# Patient Record
Sex: FEMALE | Race: WHITE | NOT HISPANIC OR LATINO | URBAN - METROPOLITAN AREA
[De-identification: names, ages, dates, MRNs, and addresses within clinical notes are randomized per-mention and may not be internally consistent; named-entity substitution may affect disease eponyms.]

---

## 2020-08-25 ENCOUNTER — INPATIENT (INPATIENT)
Facility: HOSPITAL | Age: 30
LOS: 0 days | Discharge: HOME | End: 2020-08-26
Attending: HOSPITALIST | Admitting: HOSPITALIST
Payer: MEDICAID

## 2020-08-25 VITALS
TEMPERATURE: 99 F | DIASTOLIC BLOOD PRESSURE: 71 MMHG | OXYGEN SATURATION: 96 % | RESPIRATION RATE: 16 BRPM | SYSTOLIC BLOOD PRESSURE: 120 MMHG | HEART RATE: 98 BPM

## 2020-08-25 LAB
ALBUMIN SERPL ELPH-MCNC: 4.4 G/DL — SIGNIFICANT CHANGE UP (ref 3.5–5.2)
ALP SERPL-CCNC: 54 U/L — SIGNIFICANT CHANGE UP (ref 30–115)
ALT FLD-CCNC: 7 U/L — SIGNIFICANT CHANGE UP (ref 0–41)
ANION GAP SERPL CALC-SCNC: 11 MMOL/L — SIGNIFICANT CHANGE UP (ref 7–14)
APPEARANCE CSF: CLEAR — SIGNIFICANT CHANGE UP
APPEARANCE SPUN FLD: COLORLESS — SIGNIFICANT CHANGE UP
AST SERPL-CCNC: 15 U/L — SIGNIFICANT CHANGE UP (ref 0–41)
BASOPHILS # BLD AUTO: 0.03 K/UL — SIGNIFICANT CHANGE UP (ref 0–0.2)
BASOPHILS NFR BLD AUTO: 0.4 % — SIGNIFICANT CHANGE UP (ref 0–1)
BILIRUB SERPL-MCNC: 0.4 MG/DL — SIGNIFICANT CHANGE UP (ref 0.2–1.2)
BUN SERPL-MCNC: 9 MG/DL — LOW (ref 10–20)
CALCIUM SERPL-MCNC: 9.3 MG/DL — SIGNIFICANT CHANGE UP (ref 8.5–10.1)
CHLORIDE SERPL-SCNC: 104 MMOL/L — SIGNIFICANT CHANGE UP (ref 98–110)
CO2 SERPL-SCNC: 24 MMOL/L — SIGNIFICANT CHANGE UP (ref 17–32)
COLOR CSF: COLORLESS — SIGNIFICANT CHANGE UP
CREAT SERPL-MCNC: 0.7 MG/DL — SIGNIFICANT CHANGE UP (ref 0.7–1.5)
EOSINOPHIL # BLD AUTO: 0.05 K/UL — SIGNIFICANT CHANGE UP (ref 0–0.7)
EOSINOPHIL NFR BLD AUTO: 0.7 % — SIGNIFICANT CHANGE UP (ref 0–8)
GLUCOSE CSF-MCNC: 50 MG/DL — SIGNIFICANT CHANGE UP (ref 45–75)
GLUCOSE SERPL-MCNC: 98 MG/DL — SIGNIFICANT CHANGE UP (ref 70–99)
GRAM STN FLD: SIGNIFICANT CHANGE UP
HCT VFR BLD CALC: 43 % — SIGNIFICANT CHANGE UP (ref 37–47)
HGB BLD-MCNC: 13.7 G/DL — SIGNIFICANT CHANGE UP (ref 12–16)
IMM GRANULOCYTES NFR BLD AUTO: 0.3 % — SIGNIFICANT CHANGE UP (ref 0.1–0.3)
LYMPHOCYTES # BLD AUTO: 2.08 K/UL — SIGNIFICANT CHANGE UP (ref 1.2–3.4)
LYMPHOCYTES # BLD AUTO: 27.4 % — SIGNIFICANT CHANGE UP (ref 20.5–51.1)
LYMPHOCYTES # CSF: SIGNIFICANT CHANGE UP % (ref 40–80)
MCHC RBC-ENTMCNC: 29.1 PG — SIGNIFICANT CHANGE UP (ref 27–31)
MCHC RBC-ENTMCNC: 31.9 G/DL — LOW (ref 32–37)
MCV RBC AUTO: 91.5 FL — SIGNIFICANT CHANGE UP (ref 81–99)
MONOCYTES # BLD AUTO: 0.51 K/UL — SIGNIFICANT CHANGE UP (ref 0.1–0.6)
MONOCYTES NFR BLD AUTO: 6.7 % — SIGNIFICANT CHANGE UP (ref 1.7–9.3)
NEUTROPHILS # BLD AUTO: 4.91 K/UL — SIGNIFICANT CHANGE UP (ref 1.4–6.5)
NEUTROPHILS # CSF: 0 % — SIGNIFICANT CHANGE UP (ref 0–6)
NEUTROPHILS NFR BLD AUTO: 64.5 % — SIGNIFICANT CHANGE UP (ref 42.2–75.2)
NRBC # BLD: 0 /100 WBCS — SIGNIFICANT CHANGE UP (ref 0–0)
NRBC NFR CSF: 159 /UL — HIGH (ref 0–5)
PLATELET # BLD AUTO: 199 K/UL — SIGNIFICANT CHANGE UP (ref 130–400)
POTASSIUM SERPL-MCNC: 4.1 MMOL/L — SIGNIFICANT CHANGE UP (ref 3.5–5)
POTASSIUM SERPL-SCNC: 4.1 MMOL/L — SIGNIFICANT CHANGE UP (ref 3.5–5)
PROT CSF-MCNC: 110 MG/DL — HIGH (ref 15–45)
PROT SERPL-MCNC: 6.9 G/DL — SIGNIFICANT CHANGE UP (ref 6–8)
RBC # BLD: 4.7 M/UL — SIGNIFICANT CHANGE UP (ref 4.2–5.4)
RBC # CSF: 7 /UL — SIGNIFICANT CHANGE UP (ref 0–0)
RBC # FLD: 12.2 % — SIGNIFICANT CHANGE UP (ref 11.5–14.5)
SODIUM SERPL-SCNC: 139 MMOL/L — SIGNIFICANT CHANGE UP (ref 135–146)
TUBE TYPE: SIGNIFICANT CHANGE UP
WBC # BLD: 7.6 K/UL — SIGNIFICANT CHANGE UP (ref 4.8–10.8)
WBC # FLD AUTO: 7.6 K/UL — SIGNIFICANT CHANGE UP (ref 4.8–10.8)

## 2020-08-25 PROCEDURE — 99222 1ST HOSP IP/OBS MODERATE 55: CPT

## 2020-08-25 PROCEDURE — 99285 EMERGENCY DEPT VISIT HI MDM: CPT | Mod: 25

## 2020-08-25 PROCEDURE — 70450 CT HEAD/BRAIN W/O DYE: CPT | Mod: 26

## 2020-08-25 PROCEDURE — 62270 DX LMBR SPI PNXR: CPT

## 2020-08-25 PROCEDURE — 99223 1ST HOSP IP/OBS HIGH 75: CPT

## 2020-08-25 RX ORDER — ACETAMINOPHEN 500 MG
1000 TABLET ORAL ONCE
Refills: 0 | Status: COMPLETED | OUTPATIENT
Start: 2020-08-25 | End: 2020-08-25

## 2020-08-25 RX ORDER — ACETAMINOPHEN 500 MG
650 TABLET ORAL EVERY 6 HOURS
Refills: 0 | Status: DISCONTINUED | OUTPATIENT
Start: 2020-08-25 | End: 2020-08-26

## 2020-08-25 RX ORDER — IBUPROFEN 200 MG
400 TABLET ORAL EVERY 6 HOURS
Refills: 0 | Status: DISCONTINUED | OUTPATIENT
Start: 2020-08-25 | End: 2020-08-26

## 2020-08-25 RX ORDER — ONDANSETRON 8 MG/1
4 TABLET, FILM COATED ORAL EVERY 6 HOURS
Refills: 0 | Status: DISCONTINUED | OUTPATIENT
Start: 2020-08-25 | End: 2020-08-26

## 2020-08-25 RX ORDER — CHLORHEXIDINE GLUCONATE 213 G/1000ML
1 SOLUTION TOPICAL ONCE
Refills: 0 | Status: COMPLETED | OUTPATIENT
Start: 2020-08-25 | End: 2020-08-25

## 2020-08-25 RX ORDER — SODIUM CHLORIDE 9 MG/ML
1000 INJECTION, SOLUTION INTRAVENOUS ONCE
Refills: 0 | Status: COMPLETED | OUTPATIENT
Start: 2020-08-25 | End: 2020-08-25

## 2020-08-25 RX ORDER — METOCLOPRAMIDE HCL 10 MG
10 TABLET ORAL ONCE
Refills: 0 | Status: COMPLETED | OUTPATIENT
Start: 2020-08-25 | End: 2020-08-25

## 2020-08-25 RX ADMIN — Medication 1000 MILLIGRAM(S): at 19:27

## 2020-08-25 RX ADMIN — Medication 10 MILLIGRAM(S): at 12:47

## 2020-08-25 RX ADMIN — Medication 1000 MILLIGRAM(S): at 12:47

## 2020-08-25 RX ADMIN — SODIUM CHLORIDE 1000 MILLILITER(S): 9 INJECTION, SOLUTION INTRAVENOUS at 12:49

## 2020-08-25 NOTE — ED PROVIDER NOTE - PATIENT PORTAL LINK FT
You can access the FollowMyHealth Patient Portal offered by [facility] by registering at the following website: [email or web address]. By joining [employer]’s FollowMyHealth portal, you will also be able to view your health information using other applications (apps) compatible with our system.

## 2020-08-25 NOTE — H&P ADULT - HISTORY OF PRESENT ILLNESS
This is a 30 years old female with PMH of viral meningitis 12 years ago who presents to ED with presenting complaint os headache.  Patient states that This is a 30 years old female with PMH of viral meningitis 12 years ago who presents to ED with presenting complaint os headache.  Patient states that she was in usual state of health three days ago when she developed headaches. Pain is throbbing, located on the top of her head and both sides, 10/10 in severity  constant, non-radiating, not relieved by advil , worse with sidewards and forwards movement of neck taty bending forwards associated with nausea that started today. Denies sensitivity to lights/sounds/smells. Does not ever get headaches except for when she was diagnosed with meningitis and she describes this event as "worse than when I had meningitis." Denies fevers, chills, recent illness, neck pain, abdominal pain, weakness, numbness, tingling, tinnitis, hearing problems, diplopia . She reports recently moving from [place] on 08/3/2020; denies exposure to sick contacts, OCP , Vitamin A intake.

## 2020-08-25 NOTE — ED PROVIDER NOTE - PHYSICAL EXAMINATION
CONSTITUTIONAL: Well-developed; well-nourished; appears to be in extreme pain & constantly clutching top of head.  SKIN: warm, dry  HEAD: Normocephalic; atraumatic. No tenderness to palpation.  EYES: no conjunctival injection. PERRLA. EOMI.   ENT: No nasal discharge; airway clear.  NECK: Supple; non tender. -Kernigs & Brudzinki's sign.  CARD: S1, S2 normal; no murmurs, gallops, or rubs. Regular rate and rhythm.   RESP: No wheezes, rales or rhonchi.  ABD: soft ntnd. BS+ in all 4 quadrants.  EXT: Normal ROM.  No clubbing, cyanosis or edema.   NEURO: Alert, oriented, grossly unremarkable. CN grossly intact. No focal neuro deficits.  PSYCH: Cooperative, appropriate.

## 2020-08-25 NOTE — ED PROVIDER NOTE - NSFOLLOWUPINSTRUCTIONS_ED_ALL_ED_FT
General Headache Without Cause  A headache is pain or discomfort that is felt around the head or neck area. There are many causes and types of headaches. In some cases, the cause may not be found.  Follow these instructions at home:  Watch your condition for any changes. Let your doctor know about them. Take these steps to help with your condition:  Managing pain         Take over-the-counter and prescription medicines only as told by your doctor.Lie down in a dark, quiet room when you have a headache.If told, put ice on your head and neck area:  Put ice in a plastic bag.Place a towel between your skin and the bag.Leave the ice on for 20 minutes, 2–3 times per day.If told, put heat on the affected area. Use the heat source that your doctor recommends, such as a moist heat pack or a heating pad.   Place a towel between your skin and the heat source. Leave the heat on for 20–30 minutes. Remove the heat if your skin turns bright red. This is very important if you are unable to feel pain, heat, or cold. You may have a greater risk of getting burned.Keep lights dim if bright lights bother you or make your headaches worse.Eating and drinking     Eat meals on a regular schedule.If you drink alcohol:   Limit how much you use to:   0–1 drink a day for women. 0–2 drinks a day for men. Be aware of how much alcohol is in your drink. In the [place] one drink equals one 12 oz bottle of beer (355 mL), one 5 oz glass of wine (148 mL), or one 1½ oz glass of hard liquor (44 mL).Stop drinking caffeine, or reduce how much caffeine you drink.General instructions        Keep a journal to find out if certain things bring on headaches. For example, write down:  What you eat and drink.How much sleep you get.Any change to your diet or medicines.Get a massage or try other ways to relax.Limit stress.Sit up straight. Do not tighten (tense) your muscles.Do not use any products that contain nicotine or tobacco. This includes cigarettes, e-cigarettes, and chewing tobacco. If you need help quitting, ask your doctor.Exercise regularly as told by your doctor.Get enough sleep. This often means 7–9 hours of sleep each night.Keep all follow-up visits as told by your doctor. This is important.Contact a doctor if:  Your symptoms are not helped by medicine.You have a headache that feels different than the other headaches.You feel sick to your stomach (nauseous) or you throw up (vomit).You have a fever.Get help right away if:  Your headache gets very bad quickly.Your headache gets worse after a lot of physical activity.You keep throwing up.You have a stiff neck.You have trouble seeing.You have trouble speaking.You have pain in the eye or ear.Your muscles are weak or you lose muscle control.You lose your balance or have trouble walking.You feel like you will pass out (faint) or you pass out.You are mixed up (confused).You have a seizure.Summary  A headache is pain or discomfort that is felt around the head or neck area.There are many causes and types of headaches. In some cases, the cause may not be found.Keep a journal to help find out what causes your headaches. Watch your condition for any changes. Let your doctor know about them.Contact a doctor if you have a headache that is different from usual, or if your headache is not helped by medicine.Get help right away if your headache gets very bad, you throw up, you have trouble seeing, you lose your balance, or you have a seizure.This information is not intended to replace advice given to you by your health care provider. Make sure you discuss any questions you have with your health care provider.

## 2020-08-25 NOTE — PROGRESS NOTE ADULT - ASSESSMENT
31 y/o female with previous history of viral meningitis (2013) presents with complaints of headaches for past 3 days x nausea 1 day.    #Headache likely to viral meningitis (+nuchal rigidity)  - Non contrast CT head: normal  - Follow up on lab work  - f/u COVID PCR results (recent exposure from high risk [facility]- [place])  - Lumbar puncture-> if positive for viral meningitis rx with IV fluids, analgesics and bed rest  - If lumbar puncture negative-> MRI recommended 29 y/o female with previous history of viral meningitis (approx. 12 years ago) presents with complaints of headaches for past 3 days x nausea 1 day.    #Headache likely to viral meningitis (+nuchal rigidity)  - Non contrast CT head: normal  - Follow up on lab work  - f/u COVID PCR results (recent exposure from high risk [facility]- [place])  - Lumbar puncture-> if positive for viral meningitis rx with IV fluids, analgesics and bed rest  - If lumbar puncture negative-> MRI recommended 31 y/o female with previous history of viral meningitis (approx. 12 years ago) presents with complaints of headaches for past 3 days x nausea 1 day.    #Headache likely to viral meningitis (+nuchal rigidity)  - Non contrast CT head: normal  - Follow up on lab work  - f/u COVID PCR results (recent exposure from high risk Counts include [facility] beds at the [facility]- [place])  - Lumbar puncture-> if positive for viral meningitis rx with IV fluids, analgesics and bed rest  - If lumbar puncture negative-> MRI Brain w/o SIA recommended

## 2020-08-25 NOTE — ED PROVIDER NOTE - NSFOLLOWUPCLINICS_GEN_ALL_ED_FT
[facility]  Neurology  [address] [place] [address], [facility] [address]  [place] [address]  Phone: [phone]  Fax:   Follow Up Time:

## 2020-08-25 NOTE — ED PROVIDER NOTE - PROGRESS NOTE DETAILS
CPark: Patient mildly improved. She states the throbbing is now gone but headache is still persistent. CPark: Spoke with neurology. Recommended labs and lumbar puncture. If all normal, admit for MRI. Endorsed to Dr Rene  to f/u labs, and dispo. SR: s.o received from Dr. Sheffield, as per neuro recommendation will obtain consent for LP and reassess CPark: Patient is refusing admission and would like to follow up outpatient. Risks and complications explained about leaving against medical advice. Signature obtained & scanned. CPark: Patient agreed to admission (changed her mind from signing out AMA). Admitting resident currently at bedside.

## 2020-08-25 NOTE — H&P ADULT - NSHPLABSRESULTS_GEN_ALL_CORE
< from: CT Head No Cont (08.25.20 @ 13:12) >    IMPRESSION:  No acute intracranial pathology. No evidence of midline shift, mass effect or intracranial hemorrhage.    < end of copied text >

## 2020-08-25 NOTE — ED PROVIDER NOTE - NS ED ROS FT
GEN:  no fever, no chills, no generalized weakness  NEURO:  +headache, no dizziness  ENT: no sore throat, no runny nose  CV:  no chest pain, no palpitations  RESP:  no sob, no cough  GI:  +nausea, no vomiting, no abdominal pain, no diarrhea  :  no dysuria, no urinary frequency, no hematuria  MSK:  no joint pain, no edema  SKIN:  no rash, no bruising  HEME: no hematochezia, no melena

## 2020-08-25 NOTE — ED PROVIDER NOTE - CLINICAL SUMMARY MEDICAL DECISION MAKING FREE TEXT BOX
29 yo female with a history of viral meningitis 12 years ago c/o throbbing frontal /top of head headache for 3 days. VS reviewed. labs and imaging obtained. Neuro consult placed who recommended Lumbar puncture and admission for MRI. Lumbar puncture performed. Patient admitted for an MRI

## 2020-08-25 NOTE — H&P ADULT - ASSESSMENT
This is a 30 years old female with PMH of viral meningitis 12 years ago who presents to ED with presenting complaint os headache.  She is being admitted for the work up of her headache by neurology.    Impression :  # acute onset headache, most probably tension headache.    Plan :     - throbbing headache at the vertex, no relieving factor, worsening with movement, taty bending forwards  - NL BMI, no CN VI signs, normal vision, no nausea, vomitting make pseudotumor Cerebri less likely  - CTH negative for any evidence of midline shift, mass effect or intracranial hemorrhage  - LP done in ED, looks sterile (non-bacterial) , WBC ~ 159, No neutrophils, proteins elevated , glucose normal. cant r/o viral etiology  - Neuro recommending MRI w/ contrast  - Pain management with IV ketorolac and tylenol.  - follow up neuro recs    Diet ; regular  GI ppx : protonix  DVT ppx : not needed  Activity ambulate as tolearted  Code full

## 2020-08-25 NOTE — PROGRESS NOTE ADULT - SUBJECTIVE AND OBJECTIVE BOX
SUBJECTIVE:    Patient is a 30y old Female who presents with a chief complaint of headache x 2 days      · HPI Objective Statement: 29 y/o female with previous history of viral meningitis (2013) presents with 3 days of headache. Pain is throbbing, located on the top of her head and both sides, constant, non-radiating, not relieved by 4 ibuprofen, worse with movement. Associated with nausea x1 day. Not associated with sensitivity to lights/sounds/smells. Does not ever get headaches except for when she was diagnosed with meningitis and she describes this event as "worse than when I had meningitis." Denies fevers, chills, recent illness, neck pain, abdominal pain, weakness, numbness, tingling. She reports recently moving from [place] on 08/3/2020; denies exposure to sick contacts.	        PAST MEDICAL & SURGICAL HISTORY  Viral Meningitis (2013)    ALLERGIES:  No Known Allergies    MEDICATIONS:  STANDING MEDICATIONS    PRN MEDICATIONS    VITALS:   T(F): 98.9  HR: 98  BP: 120/71  RR: 16  SpO2: 96%    LABS:                        RADIOLOGY:  < from: CT Head No Cont (08.25.20 @ 13:12) >  IMPRESSION:  No acute intracranial pathology. No evidence of midline shift, mass effect or intracranial hemorrhage.    < end of copied text >        PHYSICAL EXAM:   · Physical Examination: CONSTITUTIONAL: Well-developed; well-nourished  SKIN: warm, dry  HEAD: Normocephalic; atraumatic. No tenderness to palpation.  EYES: no conjunctival injection. PERRLA. EOMI.   ENT: No nasal discharge; airway clear.  NECK: Supple; non tender. +pain on neck flexion  CARD: S1, S2 normal; no murmurs, gallops, or rubs. Regular rate and rhythm.   RESP: No wheezes, rales or rhonchi.  ABD: soft ntnd. BS+ in all 4 quadrants.  EXT: Normal ROM.  No clubbing, cyanosis or edema.   NEURO: Alert, oriented, grossly unremarkable. CN grossly intact. No focal neuro deficits. PSYCH: Cooperative, appropriate.	      Duvall Catheter: SUBJECTIVE:    Patient is a 30y old Female who presents with a chief complaint of headache x 2 days      History of presenting illness: 29 y/o female with previous history of viral meningitis (2013) presents with 3 days of headache. Pain is throbbing, located on the top of her head and both sides, constant, non-radiating, not relieved by 4 ibuprofen, worse with movement. Associated with nausea x1 day. Not associated with sensitivity to lights/sounds/smells. Does not ever get headaches except for when she was diagnosed with meningitis and she describes this event as "worse than when I had meningitis." Denies fevers, chills, recent illness, neck pain, abdominal pain, weakness, numbness, tingling. She reports recently moving from [place] on 08/3/2020; denies exposure to sick contacts.	        PAST MEDICAL & SURGICAL HISTORY  Viral Meningitis (2013)    ALLERGIES:  No Known Allergies    MEDICATIONS:  STANDING MEDICATIONS    PRN MEDICATIONS    VITALS:   T(F): 98.9  HR: 98  BP: 120/71  RR: 16  SpO2: 96%    LABS:                        RADIOLOGY:  < from: CT Head No Cont (08.25.20 @ 13:12) >  IMPRESSION:  No acute intracranial pathology. No evidence of midline shift, mass effect or intracranial hemorrhage.    < end of copied text >        PHYSICAL EXAM:   · Physical Examination: CONSTITUTIONAL: Well-developed; well-nourished  SKIN: warm, dry  HEAD: Normocephalic; atraumatic. No tenderness to palpation.  EYES: no conjunctival injection. PERRLA. EOMI.   ENT: No nasal discharge; airway clear.  NECK: Supple; non tender. +pain on neck flexion  CARD: S1, S2 normal; no murmurs, gallops, or rubs. Regular rate and rhythm.   RESP: No wheezes, rales or rhonchi.  ABD: soft ntnd. BS+ in all 4 quadrants.  EXT: Normal ROM.  No clubbing, cyanosis or edema.   NEURO: Alert, oriented, grossly unremarkable. CN grossly intact. No focal neuro deficits. PSYCH: Cooperative, appropriate.	      Duvall Catheter: SUBJECTIVE:    Patient is a 30y old Female who presents with a chief complaint of headache x 2 days      History of presenting illness: 29 y/o female with previous history of viral meningitis approx. 12 years ago presents with 3 days of headache. Pain is throbbing, located on the top of her head and both sides, constant, non-radiating, not relieved by 4 ibuprofen, worse with movement. Associated with nausea x1 day. Not associated with sensitivity to lights/sounds/smells. Does not ever get headaches except for when she was diagnosed with meningitis and she describes this event as "worse than when I had meningitis." Denies fevers, chills, recent illness, neck pain, abdominal pain, weakness, numbness, tingling. She reports recently moving from [place] on 08/3/2020; denies exposure to sick contacts.	        PAST MEDICAL & SURGICAL HISTORY  Viral Meningitis (approx 12 years ago)    ALLERGIES:  No Known Allergies    MEDICATIONS:  STANDING MEDICATIONS    PRN MEDICATIONS    VITALS:   T(F): 98.9  HR: 98  BP: 120/71  RR: 16  SpO2: 96%    LABS:                        RADIOLOGY:  < from: CT Head No Cont (08.25.20 @ 13:12) >  IMPRESSION:  No acute intracranial pathology. No evidence of midline shift, mass effect or intracranial hemorrhage.    < end of copied text >        PHYSICAL EXAM:   · Physical Examination: CONSTITUTIONAL: Well-developed; well-nourished  SKIN: warm, dry  HEAD: Normocephalic; atraumatic. No tenderness to palpation.  EYES: no conjunctival injection. PERRLA. EOMI.   ENT: No nasal discharge; airway clear.  NECK: Supple; non tender. +pain on neck flexion  CARD: S1, S2 normal; no murmurs, gallops, or rubs. Regular rate and rhythm.   RESP: No wheezes, rales or rhonchi.  ABD: soft ntnd. BS+ in all 4 quadrants.  EXT: Normal ROM.  No clubbing, cyanosis or edema.   NEURO: Alert, oriented, grossly unremarkable. CN grossly intact. No focal neuro deficits. PSYCH: Cooperative, appropriate.	      Duvall Catheter:

## 2020-08-25 NOTE — H&P ADULT - ATTENDING COMMENTS
I saw and evaluated the patient on 08/25/2020. I have reviewed and agree with the findings and plan of care as documented above in the resident’s note (unless indicated differently below). Any necessary changes were made in the body of the text.    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 I have reviewed and agree with the findings and plan of care as documented above in the resident’s note (unless indicated differently below). Any necessary changes were made in the body of the text.    31 yo F pt p/w headache - diffuse, throbbing/aching, onset - 3 days ago, progressively worsening, assoc w/ neck stiffness and nausea, not alleviated by NSAIDS/APAP, no aggravating factors and cannot think of any precipitating factors. Denies any fever, chills, photo/phonophobia, tinnitus, visual disturbances, focal weakness, paresthesias. Reports a remote hx of meningitis (12 yrs ago) - compares this headache to how she felt then but states that her current presentation is a lot more severe. Reports that came from [place] earlier this month. Denies hx of STI's, TB exposure, foreign travel.     ROS:  Constitutional: no fever; no chills  Eyes: no conjunctivitis; no itching  ENT: no dysphagia; no odynophagia; +neck tightness  CVS: no PND; no orthopnea; no chest pain  Resp: no SOB; no coughing  GI: +nausea; no vomiting; no diarrhea; no abd pain  : no dysuria; no hematuria  MSK: no myalgias; no arthralgias  Skin: no rashes; no ulcers  Neuro: no focal weakness; +headache  All other systems reviewed and are negative    PMHx, home medications, SurHx, FHx and Social history as above in the corresponding sections of the note - reviewed and edited where appropriate    Exam:  Vitals: BP = 106/66; P = 81; T = 97.6; RR = 16; SpO2 > 95 on room air  General: appears stated age; cooperative  Eyes: anicteric sclera; moist conjunctiva w/ no lid lag; PERRL; EOMI  HENT: NC/AT; clear oropharynx w/ moist mucous membranes; nL hard/soft palate  Neck: mild stiffness/tightness but with FROM; trachea midline  Lungs: no tachypnea, accessory muscle usage, wheezing, rhonci or rales  CVS: RRR; S1 and S2 w/o MRGs  Abd: BS+; soft; non-tender to palpation x 4; no masses or HSM  Ext: no peripheral edema; pulses 2+ b/l  Skin: normal temp, turgor and texture; no rashes, ulcers or nodules  Neuro: CN II-XII intact; str 5/5 throughout; sensation grossly intact – light touch/temp  Psych: appropriate affect; alert and oriented to person, place, time and situation    Labs unrevealing  CSF studies: glucose 50, protein 110, nucleated cells 159, 100% lymphocytes, LDH 20, gram stain neg so far, Cx pending  CXR: no acute intracranial cardiac abnormality    Assessment:  (1) Meningitis - suspect viral etiology    Plan:  (1) F/u CSF studies  (2) Symptomatic management: rest in quiet dark room, analgesics, IVF's  (3) MRI pending  (4) Meds as dosed  (5) Supportive care  (6) D/c on 08/26/2020 after MRI (if stable) and if remains wnL.

## 2020-08-25 NOTE — ED PROVIDER NOTE - ATTENDING CONTRIBUTION TO CARE
31 yo female without significant PMH c/o throbbing frontal /top of head headache for 3 days, constant, non-radiating, worse with movement, yawning, ect, not relieved with OTC pain meds, or hot/cold compresses.  Developed nausea this morning.  Woke up with it 3 days ago, drove herself to the ED today.  Denies any associated fever, chills, neck pain, blurry vision, eye pain, focal weakness or paresthesias, no skin rash, joint pain or any other complaints,  Had viral meningitis in the past , but had associated fever, neck pain , this episode is different.  Well-nourished young female appears uncomfortable but in NAD, PERRL , nml conjunctivae, no meningeal signs, no cervical GALEN, nml work of breathing, speaking full sentences, RRR, well-perfused extremities, nml speech, no focal neuro deficits. Will get CT head, treat pain , reassess.  Patient is amenable with the plan,

## 2020-08-25 NOTE — ED PROVIDER NOTE - OBJECTIVE STATEMENT
29 yo female, no PMHx, presents with 3 days of headache. Pain is throbbing, located on the top of her head and both sides, constant, non-radiating, not relieved by 4 ibuprofen, worse with movement. Associated with nausea x1 day. Not associated with sensitivity to lights/sounds/smells. Does not ever get headaches except for when she was diagnosed with meningitis and she describes this event as "worse than when I had meningitis." Denies fevers, chills, recent illness, neck pain, abdominal pain, weakness, numbness, tingling.

## 2020-08-25 NOTE — H&P ADULT - NSHPPHYSICALEXAM_GEN_ALL_CORE
LOS:     VITALS:   T(C): 37.2 (08-25-20 @ 11:36), Max: 37.2 (08-25-20 @ 11:36)  HR: 98 (08-25-20 @ 11:36) (98 - 98)  BP: 120/71 (08-25-20 @ 11:36) (120/71 - 120/71)  RR: 16 (08-25-20 @ 11:36) (16 - 16)  SpO2: 96% (08-25-20 @ 11:36) (96% - 96%)    GENERAL: NAD, lying in bed comfortably  HEAD:  Atraumatic, Normocephalic  EYES: EOMI, PERRLA, conjunctiva and sclera clear  ENT: Moist mucous membranes  NECK: Supple, No JVD  CHEST/LUNG: Clear to auscultation bilaterally; No rales, rhonchi, wheezing, or rubs. Unlabored respirations  HEART: Regular rate and rhythm; No murmurs, rubs, or gallops  ABDOMEN: BSx4; Soft, nontender, nondistended  EXTREMITIES:  2+ Peripheral Pulses, brisk capillary refill. No clubbing, cyanosis, or edema  NERVOUS SYSTEM:  A&Ox3, no focal deficits   SKIN: No rashes or lesions

## 2020-08-26 ENCOUNTER — TRANSCRIPTION ENCOUNTER (OUTPATIENT)
Age: 30
End: 2020-08-26

## 2020-08-26 VITALS
TEMPERATURE: 98 F | RESPIRATION RATE: 16 BRPM | OXYGEN SATURATION: 98 % | SYSTOLIC BLOOD PRESSURE: 106 MMHG | DIASTOLIC BLOOD PRESSURE: 66 MMHG | HEART RATE: 81 BPM | WEIGHT: 125.22 LBS

## 2020-08-26 LAB
A1C WITH ESTIMATED AVERAGE GLUCOSE RESULT: 5.1 % — SIGNIFICANT CHANGE UP (ref 4–5.6)
ALBUMIN SERPL ELPH-MCNC: 3.8 G/DL — SIGNIFICANT CHANGE UP (ref 3.5–5.2)
ALP SERPL-CCNC: 46 U/L — SIGNIFICANT CHANGE UP (ref 30–115)
ALT FLD-CCNC: 7 U/L — SIGNIFICANT CHANGE UP (ref 0–41)
ANION GAP SERPL CALC-SCNC: 9 MMOL/L — SIGNIFICANT CHANGE UP (ref 7–14)
AST SERPL-CCNC: 13 U/L — SIGNIFICANT CHANGE UP (ref 0–41)
BASOPHILS # BLD AUTO: 0.04 K/UL — SIGNIFICANT CHANGE UP (ref 0–0.2)
BASOPHILS NFR BLD AUTO: 0.7 % — SIGNIFICANT CHANGE UP (ref 0–1)
BILIRUB SERPL-MCNC: 0.4 MG/DL — SIGNIFICANT CHANGE UP (ref 0.2–1.2)
BUN SERPL-MCNC: 7 MG/DL — LOW (ref 10–20)
C NEOFORM RRNA SPEC NAA+PROBE-ACNC: SIGNIFICANT CHANGE UP
CALCIUM SERPL-MCNC: 8.7 MG/DL — SIGNIFICANT CHANGE UP (ref 8.5–10.1)
CHLORIDE SERPL-SCNC: 105 MMOL/L — SIGNIFICANT CHANGE UP (ref 98–110)
CMV DNA CSF QL NAA+PROBE: SIGNIFICANT CHANGE UP
CO2 SERPL-SCNC: 27 MMOL/L — SIGNIFICANT CHANGE UP (ref 17–32)
CREAT SERPL-MCNC: 0.7 MG/DL — SIGNIFICANT CHANGE UP (ref 0.7–1.5)
CSF PCR RESULT: DETECTED
E COLI K1 DNA CSF QL NAA+NON-PROBE: SIGNIFICANT CHANGE UP
EOSINOPHIL # BLD AUTO: 0.12 K/UL — SIGNIFICANT CHANGE UP (ref 0–0.7)
EOSINOPHIL NFR BLD AUTO: 2 % — SIGNIFICANT CHANGE UP (ref 0–8)
ESCHERICHIA COLI K1: SIGNIFICANT CHANGE UP
ESTIMATED AVERAGE GLUCOSE: 100 MG/DL — SIGNIFICANT CHANGE UP (ref 68–114)
EV RNA CSF QL NAA+PROBE: SIGNIFICANT CHANGE UP
GLUCOSE SERPL-MCNC: 91 MG/DL — SIGNIFICANT CHANGE UP (ref 70–99)
GP B STREP DNA SPEC QL NAA+PROBE: SIGNIFICANT CHANGE UP
GRAM STN FLD: SIGNIFICANT CHANGE UP
HAEM INFLU DNA SPEC QL NAA+PROBE: SIGNIFICANT CHANGE UP
HCT VFR BLD CALC: 40.2 % — SIGNIFICANT CHANGE UP (ref 37–47)
HGB BLD-MCNC: 12.8 G/DL — SIGNIFICANT CHANGE UP (ref 12–16)
HHV6 DNA CSF QL NAA+PROBE: SIGNIFICANT CHANGE UP
HSV1 DNA CSF QL NAA+PROBE: SIGNIFICANT CHANGE UP
HSV2 DNA CSF QL NAA+PROBE: DETECTED
IMM GRANULOCYTES NFR BLD AUTO: 0.3 % — SIGNIFICANT CHANGE UP (ref 0.1–0.3)
L MONOCYTOG DNA SPEC QL NAA+PROBE: SIGNIFICANT CHANGE UP
LABORATORY COMMENT REPORT: (no result)
LDH CSF L TO P-CCNC: 20 U/L — SIGNIFICANT CHANGE UP
LDH FLD-CCNC: 20 U/L — SIGNIFICANT CHANGE UP
LYMPHOCYTES # BLD AUTO: 2.46 K/UL — SIGNIFICANT CHANGE UP (ref 1.2–3.4)
LYMPHOCYTES # BLD AUTO: 41.7 % — SIGNIFICANT CHANGE UP (ref 20.5–51.1)
MAGNESIUM SERPL-MCNC: 1.8 MG/DL — SIGNIFICANT CHANGE UP (ref 1.8–2.4)
MCHC RBC-ENTMCNC: 29.2 PG — SIGNIFICANT CHANGE UP (ref 27–31)
MCHC RBC-ENTMCNC: 31.8 G/DL — LOW (ref 32–37)
MCV RBC AUTO: 91.8 FL — SIGNIFICANT CHANGE UP (ref 81–99)
MONOCYTES # BLD AUTO: 0.62 K/UL — HIGH (ref 0.1–0.6)
MONOCYTES NFR BLD AUTO: 10.5 % — HIGH (ref 1.7–9.3)
N MEN DNA SPEC QL NAA+PROBE: SIGNIFICANT CHANGE UP
NEUTROPHILS # BLD AUTO: 2.64 K/UL — SIGNIFICANT CHANGE UP (ref 1.4–6.5)
NEUTROPHILS NFR BLD AUTO: 44.8 % — SIGNIFICANT CHANGE UP (ref 42.2–75.2)
NIGHT BLUE STAIN TISS: SIGNIFICANT CHANGE UP
NRBC # BLD: 0 /100 WBCS — SIGNIFICANT CHANGE UP (ref 0–0)
PARECHOVIRUS A RNA SPEC QL NAA+PROBE: SIGNIFICANT CHANGE UP
PHOSPHATE SERPL-MCNC: 4.1 MG/DL — SIGNIFICANT CHANGE UP (ref 2.1–4.9)
PLATELET # BLD AUTO: 177 K/UL — SIGNIFICANT CHANGE UP (ref 130–400)
POTASSIUM SERPL-MCNC: 4.6 MMOL/L — SIGNIFICANT CHANGE UP (ref 3.5–5)
POTASSIUM SERPL-SCNC: 4.6 MMOL/L — SIGNIFICANT CHANGE UP (ref 3.5–5)
PROT SERPL-MCNC: 5.9 G/DL — LOW (ref 6–8)
RBC # BLD: 4.38 M/UL — SIGNIFICANT CHANGE UP (ref 4.2–5.4)
RBC # FLD: 12 % — SIGNIFICANT CHANGE UP (ref 11.5–14.5)
S PNEUM DNA SPEC QL NAA+PROBE: SIGNIFICANT CHANGE UP
SARS-COV-2 IGG SERPL QL IA: NEGATIVE — SIGNIFICANT CHANGE UP
SARS-COV-2 IGM SERPL IA-ACNC: 0.08 INDEX — SIGNIFICANT CHANGE UP
SARS-COV-2 RNA SPEC QL NAA+PROBE: SIGNIFICANT CHANGE UP
SODIUM SERPL-SCNC: 141 MMOL/L — SIGNIFICANT CHANGE UP (ref 135–146)
SOURCE HSV 1/2: SIGNIFICANT CHANGE UP
SPECIMEN SOURCE: SIGNIFICANT CHANGE UP
SPECIMEN SOURCE: SIGNIFICANT CHANGE UP
VZV DNA CSF QL NAA+PROBE: SIGNIFICANT CHANGE UP
WBC # BLD: 5.9 K/UL — SIGNIFICANT CHANGE UP (ref 4.8–10.8)
WBC # FLD AUTO: 5.9 K/UL — SIGNIFICANT CHANGE UP (ref 4.8–10.8)

## 2020-08-26 PROCEDURE — 70551 MRI BRAIN STEM W/O DYE: CPT | Mod: 26

## 2020-08-26 PROCEDURE — 99239 HOSP IP/OBS DSCHRG MGMT >30: CPT

## 2020-08-26 RX ORDER — VALACYCLOVIR 500 MG/1
1 TABLET, FILM COATED ORAL
Qty: 42 | Refills: 0
Start: 2020-08-26 | End: 2020-09-08

## 2020-08-26 RX ORDER — KETOROLAC TROMETHAMINE 30 MG/ML
15 SYRINGE (ML) INJECTION ONCE
Refills: 0 | Status: DISCONTINUED | OUTPATIENT
Start: 2020-08-26 | End: 2020-08-26

## 2020-08-26 RX ORDER — ACYCLOVIR SODIUM 500 MG
VIAL (EA) INTRAVENOUS
Refills: 0 | Status: DISCONTINUED | OUTPATIENT
Start: 2020-08-26 | End: 2020-08-26

## 2020-08-26 RX ORDER — SODIUM CHLORIDE 9 MG/ML
1000 INJECTION, SOLUTION INTRAVENOUS
Refills: 0 | Status: DISCONTINUED | OUTPATIENT
Start: 2020-08-26 | End: 2020-08-26

## 2020-08-26 RX ORDER — ACYCLOVIR SODIUM 500 MG
550 VIAL (EA) INTRAVENOUS EVERY 8 HOURS
Refills: 0 | Status: DISCONTINUED | OUTPATIENT
Start: 2020-08-26 | End: 2020-08-26

## 2020-08-26 RX ORDER — ACYCLOVIR SODIUM 500 MG
550 VIAL (EA) INTRAVENOUS ONCE
Refills: 0 | Status: COMPLETED | OUTPATIENT
Start: 2020-08-26 | End: 2020-08-26

## 2020-08-26 RX ADMIN — Medication 261 MILLIGRAM(S): at 13:09

## 2020-08-26 RX ADMIN — SODIUM CHLORIDE 75 MILLILITER(S): 9 INJECTION, SOLUTION INTRAVENOUS at 03:51

## 2020-08-26 RX ADMIN — Medication 400 MILLIGRAM(S): at 07:57

## 2020-08-26 RX ADMIN — Medication 400 MILLIGRAM(S): at 08:27

## 2020-08-26 RX ADMIN — SODIUM CHLORIDE 75 MILLILITER(S): 9 INJECTION, SOLUTION INTRAVENOUS at 07:57

## 2020-08-26 RX ADMIN — Medication 15 MILLIGRAM(S): at 00:18

## 2020-08-26 NOTE — CONSULT NOTE ADULT - ASSESSMENT
ASSESSMENT  30y F admitted with HSV2 meningitis    IMPRESSION  #HSV-2 meningitis    Total Nucleated Cell Count, CSF: 159 /uL (08.25.20 @ 17:11)    CSF Lymphocytes: 100% % (08.25.20 @ 17:11)    Protein, CSF: 110 mg/dL (08.25.20 @ 17:11)    Glucose, CSF: 50 mg/dL (08.25.20 @ 17:11)    RECOMMENDATIONS  - Started on IV ACV, pt requesting to go home as feels better, OK to D/C on PO Valtrex 1g TID x 10-14 days pending clinical response. Data regarding antiviral treatment is controversial in the immunocompetent host.  - no current role for antiviral ppx after treatment course  - can offer HIV screening    If any questions, please call or send a message on [employer] Teams  Spectra [phone]

## 2020-08-26 NOTE — DISCHARGE NOTE PROVIDER - CARE PROVIDER_API CALL
Kathrine Mcintosh)  Internal Medicine  [address] [place] [id number]  Phone: [phone]  Fax: [phone]  Follow Up Time:

## 2020-08-26 NOTE — DISCHARGE NOTE PROVIDER - HOSPITAL COURSE
This is a 30 years old female with PMH of viral meningitis 12 years ago who presents to ED with presenting complaint os headache. She waas in usual state of health three days ago when she developed headaches. Pain is throbbing, located on the top of her head and both sides, 10/10 in severity  constant, non-radiating, not relieved by advil , worse with sidewards and forwards movement of neck taty bending forwards associated with nausea that started today. Denies sensitivity to lights/sounds/smells. Does not ever get headaches except for when she was diagnosed with meningitis and she describes this event as "worse than when I had meningitis." Denies fevers, chills, recent illness, neck pain, abdominal pain, weakness, numbness, tingling, tinnitis, hearing problems, diplopia . She reports recently moving from [place] on 08/3/2020; denies exposure to sick contacts, OCP , Vitamin A intake.        CSF noted for HSV2 + and she was started on treatement inpatient with IV Acyclovir. Will be treated with PO Acyclovir on discharge to complete treatment course. Will need outpatient follow up. This is a 30 years old female with PMH of viral meningitis 12 years ago who presents to ED with presenting complaint os headache. She waas in usual state of health three days ago when she developed headaches. Pain is throbbing, located on the top of her head and both sides, 10/10 in severity  constant, non-radiating, not relieved by advil , worse with sidewards and forwards movement of neck taty bending forwards associated with nausea that started today. Denies sensitivity to lights/sounds/smells. Does not ever get headaches except for when she was diagnosed with meningitis and she describes this event as "worse than when I had meningitis." Denies fevers, chills, recent illness, neck pain, abdominal pain, weakness, numbness, tingling, tinnitis, hearing problems, diplopia . She reports recently moving from [place] on 08/3/2020; denies exposure to sick contacts, OCP , Vitamin A intake.        CSF noted for HSV2 + and she was started on treatement inpatient with IV Acyclovir. Will be treated with PO Valtrex on discharge to complete treatment course. Will need outpatient follow up with ID; information provided on discharge.

## 2020-08-26 NOTE — DISCHARGE NOTE PROVIDER - NSDCCPCAREPLAN_GEN_ALL_CORE_FT
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 Please take the Valtrex 1gm three times a day as prescribed. Please follow up with the ID doctor after course completion. Please continue to use over the counter medications for helping with the headache pain. Headaches will subside over the course of the next couple of days.

## 2020-08-26 NOTE — DISCHARGE NOTE PROVIDER - NSFOLLOWUPCLINICS_GEN_ALL_ED_FT
[facility]  Medicine  [address], [place]   Phone: [phone]  Fax:   Follow Up Time:

## 2020-08-26 NOTE — DISCHARGE NOTE PROVIDER - NSDCCONDITION_GEN_ALL_CORE
Detail Level: Zone
Text: The above diagnosis and findings were noted on the exam but not discussed at this time with the patient.
Stable

## 2020-08-26 NOTE — PROGRESS NOTE ADULT - SUBJECTIVE AND OBJECTIVE BOX
Hospital Day:  1d    Patient is a 30y old  Female who presents with a chief complaint of 1d    Subjective:  No overnight events. Seen and examined this morning. Noted results for positive HSV2 in CSF. Patient to be seen by ID today. She reports no sexual intercourse over the past month as she has been back living with family. States one sexual partner for the past 12-13 years and both have been tested as "clean" in the past. States that she has been monogamous and he has been monogamous as well. Of note reports improvement     Past Medical Hx:     Past Sx:    Allergies:  No Known Allergies    Current Meds:   Standng Meds:  acyclovir IVPB      acyclovir IVPB 550 milliGRAM(s) IV Intermittent once  acyclovir IVPB 550 milliGRAM(s) IV Intermittent every 8 hours  lactated ringers. 1000 milliLiter(s) (75 mL/Hr) IV Continuous <Continuous>    PRN Meds:  acetaminophen   Tablet .. 650 milliGRAM(s) Oral every 6 hours PRN Mild Pain (1 - 3)  ibuprofen  Tablet. 400 milliGRAM(s) Oral every 6 hours PRN Moderate Pain (4 - 6)  ondansetron Injectable 4 milliGRAM(s) IV Push every 6 hours PRN Nausea    HOME MEDICATIONS:    Vital Signs:   T(F): 97.6 (08-26-20 @ 08:11), Max: 98.4 (08-25-20 @ 20:00)  HR: 81 (08-26-20 @ 08:11) (81 - 84)  BP: 106/66 (08-26-20 @ 08:11) (94/59 - 118/72)  RR: 16 (08-26-20 @ 08:11) (16 - 18)  SpO2: 98% (08-26-20 @ 08:11) (98% - 99%)    Physical Exam:   GENERAL: NAD, Pleasant and conversive, appearing stated age,  HEENT: NCAT, PERRLA, EOMI  CHEST/LUNG: Clear to auscultation bilaterally, No wheezing, rhonchi, rales   HEART: Regular rate and rhythm; s1 s2 appreciated, No murmurs, rubs, or gallops  ABDOMEN: Soft, Nontender, Nondistended; Bowel sounds present  EXTREMITIES: No LE edema b/l, Peripheral Pulses 2+, No cyanosis, no clubbing  SKIN: no rashes, no new lesions.   GENITAL/URINARY: Refused by the patient; stated no lesions or discharge  NERVOUS SYSTEM:  Alert & Oriented X3, cranial nerves ii-xii grossly intact, strength 5/5 in bilateral upper and lower extremities.   LINES/CATHETERS: Peripheral IV    Labs:                         12.8   5.90  )-----------( 177      ( 26 Aug 2020 04:46 )             40.2     Neutophil% 44.8, Lymphocyte% 41.7, Monocyte% 10.5, Bands% 0.3 08-26-20 @ 04:46    26 Aug 2020 04:46    141    |  105    |  7      ----------------------------<  91     4.6     |  27     |  0.7      Ca    8.7        26 Aug 2020 04:46  Phos  4.1       26 Aug 2020 04:46  Mg     1.8       26 Aug 2020 04:46    TPro  5.9    /  Alb  3.8    /  TBili  0.4    /  DBili  x      /  AST  13     /  ALT  7      /  AlkPhos  46     26 Aug 2020 04:46    Radiology:   MRI: Results pending    Assessment and Plan:   This is a 30 year female with PMHx of viral meningitis who presents with severe headaches     #Throbbing Headaches; likely recurrence of viral meningitis   - 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   - MRI performed; pending results. Will like to see if any temporal lobe issues   - Started on 10mg/kg of IV Acyclovir  - ID consulted  - Pain control  - Neuro aware and following  - Droplet precautions; staff to change face mask after exiting the room     Activity: As tolerated  Diet: Regular  DVT ppx: Not indicated  GI ppx: Protonix  Code Status: Full Code  DISPO: From home; d/c planning Hospital Day:  1d    Patient is a 30y old  Female who presents with a chief complaint of 1d    Subjective:  No overnight events. Seen and examined this morning. Noted results for positive HSV2 in CSF. Patient to be seen by ID today. She reports no sexual intercourse over the past month as she has been back living with family. States one sexual partner for the past 12-13 years and both have been tested as "clean" in the past. States that she has been monogamous and he has been monogamous as well. Of note reports improvement in her headache this morning. States feeling better today and hopeful about being able to get oral treatment and discharged home.     Past Medical Hx:     Past Sx:    Allergies:  No Known Allergies    Current Meds:   Standng Meds:  acyclovir IVPB      acyclovir IVPB 550 milliGRAM(s) IV Intermittent once  acyclovir IVPB 550 milliGRAM(s) IV Intermittent every 8 hours  lactated ringers. 1000 milliLiter(s) (75 mL/Hr) IV Continuous <Continuous>    PRN Meds:  acetaminophen   Tablet .. 650 milliGRAM(s) Oral every 6 hours PRN Mild Pain (1 - 3)  ibuprofen  Tablet. 400 milliGRAM(s) Oral every 6 hours PRN Moderate Pain (4 - 6)  ondansetron Injectable 4 milliGRAM(s) IV Push every 6 hours PRN Nausea    HOME MEDICATIONS:    Vital Signs:   T(F): 97.6 (08-26-20 @ 08:11), Max: 98.4 (08-25-20 @ 20:00)  HR: 81 (08-26-20 @ 08:11) (81 - 84)  BP: 106/66 (08-26-20 @ 08:11) (94/59 - 118/72)  RR: 16 (08-26-20 @ 08:11) (16 - 18)  SpO2: 98% (08-26-20 @ 08:11) (98% - 99%)    Physical Exam:   GENERAL: NAD, Pleasant and conversive, appearing stated age,  HEENT: NCAT, PERRLA, EOMI  CHEST/LUNG: Clear to auscultation bilaterally, No wheezing, rhonchi, rales   HEART: Regular rate and rhythm; s1 s2 appreciated, No murmurs, rubs, or gallops  ABDOMEN: Soft, Nontender, Nondistended; Bowel sounds present  EXTREMITIES: No LE edema b/l, Peripheral Pulses 2+, No cyanosis, no clubbing  SKIN: no rashes, no new lesions.   GENITAL/URINARY: Refused by the patient; stated no lesions or discharge  NERVOUS SYSTEM:  Alert & Oriented X3, cranial nerves ii-xii grossly intact, strength 5/5 in bilateral upper and lower extremities.   LINES/CATHETERS: Peripheral IV    Labs:                         12.8   5.90  )-----------( 177      ( 26 Aug 2020 04:46 )             40.2     Neutophil% 44.8, Lymphocyte% 41.7, Monocyte% 10.5, Bands% 0.3 08-26-20 @ 04:46    26 Aug 2020 04:46    141    |  105    |  7      ----------------------------<  91     4.6     |  27     |  0.7      Ca    8.7        26 Aug 2020 04:46  Phos  4.1       26 Aug 2020 04:46  Mg     1.8       26 Aug 2020 04:46    TPro  5.9    /  Alb  3.8    /  TBili  0.4    /  DBili  x      /  AST  13     /  ALT  7      /  AlkPhos  46     26 Aug 2020 04:46    Radiology:   MRI: Results pending    Assessment and Plan:   This is a 30 year female with PMHx of viral meningitis who presents with severe headaches     #Throbbing Headaches; likely recurrence of viral meningitis   - 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   - MRI performed; pending results. Will like to see if any temporal lobe issues   - Started on 10mg/kg of IV Acyclovir  - ID consulted  - Pain control  - Neuro aware and following  - Droplet precautions; staff to change face mask after exiting the room     Activity: As tolerated  Diet: Regular  DVT ppx: Not indicated  GI ppx: Protonix  Code Status: Full Code  DISPO: From home; d/c planning Hospital Day:  1d    Patient is a 30y old  Female who presents with a chief complaint of 1d    Subjective:  No overnight events. Seen and examined this morning. Noted results for positive HSV2 in CSF. Patient to be seen by ID today. She reports no sexual intercourse over the past month as she has been back living with family. States one sexual partner for the past 12-13 years and both have been tested as "clean" in the past. States that she has been monogamous and he has been monogamous as well. Of note reports improvement in her headache this morning. States feeling better today and hopeful about being able to get oral treatment and discharged home.     Past Medical Hx:     Past Sx:    Allergies:  No Known Allergies    Current Meds:   Standng Meds:  acyclovir IVPB      acyclovir IVPB 550 milliGRAM(s) IV Intermittent once  acyclovir IVPB 550 milliGRAM(s) IV Intermittent every 8 hours  lactated ringers. 1000 milliLiter(s) (75 mL/Hr) IV Continuous <Continuous>    PRN Meds:  acetaminophen   Tablet .. 650 milliGRAM(s) Oral every 6 hours PRN Mild Pain (1 - 3)  ibuprofen  Tablet. 400 milliGRAM(s) Oral every 6 hours PRN Moderate Pain (4 - 6)  ondansetron Injectable 4 milliGRAM(s) IV Push every 6 hours PRN Nausea    HOME MEDICATIONS:    Vital Signs:   T(F): 97.6 (08-26-20 @ 08:11), Max: 98.4 (08-25-20 @ 20:00)  HR: 81 (08-26-20 @ 08:11) (81 - 84)  BP: 106/66 (08-26-20 @ 08:11) (94/59 - 118/72)  RR: 16 (08-26-20 @ 08:11) (16 - 18)  SpO2: 98% (08-26-20 @ 08:11) (98% - 99%)    Physical Exam:   GENERAL: NAD, Pleasant and conversive, appearing stated age,  HEENT: NCAT, PERRLA, EOMI  CHEST/LUNG: Clear to auscultation bilaterally, No wheezing, rhonchi, rales   HEART: Regular rate and rhythm; s1 s2 appreciated, No murmurs, rubs, or gallops  ABDOMEN: Soft, Nontender, Nondistended; Bowel sounds present  EXTREMITIES: No LE edema b/l, Peripheral Pulses 2+, No cyanosis, no clubbing  SKIN: no rashes, no new lesions.   GENITAL/URINARY: Refused by the patient; stated no lesions or discharge  NERVOUS SYSTEM:  Alert & Oriented X3, cranial nerves ii-xii grossly intact, strength 5/5 in bilateral upper and lower extremities.   LINES/CATHETERS: Peripheral IV    Labs:                         12.8   5.90  )-----------( 177      ( 26 Aug 2020 04:46 )             40.2     Neutophil% 44.8, Lymphocyte% 41.7, Monocyte% 10.5, Bands% 0.3 08-26-20 @ 04:46    26 Aug 2020 04:46    141    |  105    |  7      ----------------------------<  91     4.6     |  27     |  0.7      Ca    8.7        26 Aug 2020 04:46  Phos  4.1       26 Aug 2020 04:46  Mg     1.8       26 Aug 2020 04:46    TPro  5.9    /  Alb  3.8    /  TBili  0.4    /  DBili  x      /  AST  13     /  ALT  7      /  AlkPhos  46     26 Aug 2020 04:46    Radiology:   MRI: Results pending    Assessment and Plan:   This is a 30 year female with PMHx of viral meningitis who presents with severe headaches     #Throbbing Headaches; likely recurrence of viral meningitis   - 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   - MRI performed; pending results. Will like to see if any temporal lobe issues   - Started on 10mg/kg of IV Acyclovir  - ID consulted  - Pain control  - Neuro aware and following  - Droplet precautions; staff to change face mask after exiting the room     Activity: As tolerated  Diet: Regular  DVT ppx: Not indicated  GI ppx: Protonix  Code Status: Full Code  DISPO: From home; d/c planning      FINAL DISCHARGE INTERVIEW:  Resident(s) Present: García Wang    DISCHARGE MEDICATION RECONCILIATION  reviewed with Attending Dr. Solares    DISPOSITION:   [ x ] Home,    [  ] Home with Visiting Nursing Services,   [    ]  SNF/ NH,    [   ] Acute Rehab (4A),   [   ] Other (Specify:_________)

## 2020-08-26 NOTE — CONSULT NOTE ADULT - SUBJECTIVE AND OBJECTIVE BOX
ENEIDAKATE CRAWFORD  30y, Female  Allergy: No Known Allergies      CHIEF COMPLAINT: Headache (26 Aug 2020 11:52)      LOS  1d    HPI:  This is a 30 years old female with PMH of viral meningitis 12 years ago who presents to ED with presenting complaint os headache.  Patient states that she was in usual state of health three days ago when she developed headaches. Pain is throbbing, located on the top of her head and both sides, 10/10 in severity  constant, non-radiating, not relieved by advil , worse with sidewards and forwards movement of neck taty bending forwards associated with nausea that started today. Denies sensitivity to lights/sounds/smells. Does not ever get headaches except for when she was diagnosed with meningitis and she describes this event as "worse than when I had meningitis." Denies fevers, chills, recent illness, neck pain, abdominal pain, weakness, numbness, tingling, tinnitis, hearing problems, diplopia . She reports recently moving from [place] on 08/3/2020; denies exposure to sick contacts, OCP , Vitamin A intake. 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   Denies any hx of oral or genital ulcers  With the same male sexual partner for > 13 yrs- he also does not have any ulcers    She reported HA and neck stiffness x 3 days.     LP CSF PCR +HSV 2    PAST MEDICAL & SURGICAL HISTORY:  as noted above     FAMILY HISTORY  non-contributory     SOCIAL HISTORY  Social History:  single, denies  smoking tobacco  drinks alcohol occasionally (25 Aug 2020 19:33)        ROS  General: Denies rigors, nightsweats  HEENT: as noted above   CV: Denies CP, palpitations  PULM: Denies wheezing, hemoptysis  GI: Denies hematemesis, hematochezia, melena  : Denies discharge, hematuria  MSK: Denies arthralgias, myalgias  SKIN: Denies rash, lesions  NEURO: Denies paresthesias, weakness  PSYCH: Denies depression, anxiety       VITALS:  T(F): 97.6, Max: 98.4 (08-25-20 @ 20:00)  HR: 81  BP: 106/66  RR: 16Vital Signs Last 24 Hrs  T(C): 36.4 (26 Aug 2020 08:11), Max: 36.9 (25 Aug 2020 20:00)  T(F): 97.6 (26 Aug 2020 08:11), Max: 98.4 (25 Aug 2020 20:00)  HR: 81 (26 Aug 2020 08:11) (81 - 84)  BP: 106/66 (26 Aug 2020 08:11) (94/59 - 118/72)  BP(mean): --  RR: 16 (26 Aug 2020 08:11) (16 - 18)  SpO2: 98% (26 Aug 2020 08:11) (98% - 99%)    PHYSICAL EXAM:  Gen: NAD, resting in bed  HEENT: Normocephalic, atraumatic  Neck: supple, no lymphadenopathy, no stiffness, can touch chin to chest  CV: Regular rate & regular rhythm  Lungs: CTAB   Abdomen: Soft, BS present  Ext: Warm, well perfused  Neuro: non focal, awake  Skin: no rash, no vesicles on UE, LE, trunk, sacral, rectal or vaginal area  Lines: no phlebitis     TESTS & MEASUREMENTS:                        12.8   5.90  )-----------( 177      ( 26 Aug 2020 04:46 )             40.2     08-26    141  |  105  |  7<L>  ----------------------------<  91  4.6   |  27  |  0.7    Ca    8.7      26 Aug 2020 04:46  Phos  4.1     08-26  Mg     1.8     08-26    TPro  5.9<L>  /  Alb  3.8  /  TBili  0.4  /  DBili  x   /  AST  13  /  ALT  7   /  AlkPhos  46  08-26    eGFR if Non African American: 115 mL/min/1.73M2 (08-26-20 @ 04:46)  eGFR if : 133 mL/min/1.73M2 (08-26-20 @ 04:46)  eGFR if : 133 mL/min/1.73M2 (08-25-20 @ 15:15)  eGFR if Non African American: 115 mL/min/1.73M2 (08-25-20 @ 15:15)    LIVER FUNCTIONS - ( 26 Aug 2020 04:46 )  Alb: 3.8 g/dL / Pro: 5.9 g/dL / ALK PHOS: 46 U/L / ALT: 7 U/L / AST: 13 U/L / GGT: x               Culture - Fungal, CSF (collected 08-25-20 @ 17:11)  Source: .CSF CSF  Preliminary Report (08-26-20 @ 07:36):    Testing in progress    Culture - Acid Fast - CSF (collected 08-25-20 @ 17:11)  Source: .CSF CSF    Culture - CSF with Gram Stain (collected 08-25-20 @ 17:11)  Source: .CSF CSF  Gram Stain (08-26-20 @ 02:01):    polymorphonuclear leukocytes seen    No organisms seen    by cytocentrifuge            INFECTIOUS DISEASES TESTING      RADIOLOGY & ADDITIONAL TESTS:  I have personally reviewed the last Chest xray  CXR      CT      CARDIOLOGY TESTING      MEDICATIONS  acyclovir IVPB     acyclovir IVPB 550 IV Intermittent once  acyclovir IVPB 550 IV Intermittent every 8 hours  lactated ringers. 1000 IV Continuous <Continuous>      Weight  Weight (kg): 56.8 (08-26-20 @ 08:11)    ANTIBIOTICS:  acyclovir IVPB      acyclovir IVPB 550 milliGRAM(s) IV Intermittent once  acyclovir IVPB 550 milliGRAM(s) IV Intermittent every 8 hours      ALLERGIES:  No Known Allergies

## 2020-08-26 NOTE — PROGRESS NOTE ADULT - ATTENDING COMMENTS
Patient seen and examined independently. I agree with the resident's note, physical exam, and plan except as below.  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
Patient seen and examined with resident and agree with above except as noted.  Patients history obtained by me and exam done by me.  Reviewed CTH and compared to prior CTH. NO labs drawn and requested basic labs to be drawn.  Patients headache worse when moving head, bending forward and radiates to top of head and front.  NOt responding to NSAID at home.  Exam normal no focal findings other than neck flexion provoking the pain.    Plan as above

## 2020-08-26 NOTE — DISCHARGE NOTE NURSING/CASE MANAGEMENT/SOCIAL WORK - PATIENT PORTAL LINK FT
You can access the FollowMyHealth Patient Portal offered by [facility] by registering at the following website: [email or web address]. By joining [employer]’s FollowMyHealth portal, you will also be able to view your health information using other applications (apps) compatible with our system.

## 2020-08-27 LAB — CRYPTOC AG CSF-ACNC: NEGATIVE — SIGNIFICANT CHANGE UP

## 2020-08-28 DIAGNOSIS — G44.89 OTHER HEADACHE SYNDROME: ICD-10-CM

## 2020-08-28 DIAGNOSIS — Z86.61 PERSONAL HISTORY OF INFECTIONS OF THE CENTRAL NERVOUS SYSTEM: ICD-10-CM

## 2020-08-28 DIAGNOSIS — B00.3 HERPESVIRAL MENINGITIS: ICD-10-CM

## 2020-08-28 DIAGNOSIS — R51 HEADACHE: ICD-10-CM

## 2020-08-28 LAB
CULTURE RESULTS: NO GROWTH — SIGNIFICANT CHANGE UP
SPECIMEN SOURCE: SIGNIFICANT CHANGE UP
VDRL CSF-TITR: NEGATIVE — SIGNIFICANT CHANGE UP

## 2020-09-11 ENCOUNTER — APPOINTMENT (OUTPATIENT)
Dept: INTERNAL MEDICINE | Facility: CLINIC | Age: 30
End: 2020-09-11

## 2020-09-23 LAB
CULTURE RESULTS: SIGNIFICANT CHANGE UP
SPECIMEN SOURCE: SIGNIFICANT CHANGE UP

## 2020-10-14 LAB
CULTURE RESULTS: SIGNIFICANT CHANGE UP
SPECIMEN SOURCE: SIGNIFICANT CHANGE UP

## 2020-12-06 NOTE — H&P ADULT - NSHPOUTPATIENTPROVIDERS_GEN_ALL_CORE
Paged regarding elevated blood pressure (180/92) and subjective shortness of breath.   SpO2 has remained in the upper 90's on room air.      --One time SL Nitroglycerin ordered.      Demario Storm PA-C   does not have a PCP

## 2021-02-12 NOTE — DISCHARGE NOTE PROVIDER - NS AS DC PROVIDER CONTACT Y/N MULTI
Quality 110: Preventive Care And Screening: Influenza Immunization: Influenza Immunization Administered during Influenza season
Detail Level: Detailed
Yes

## 2023-07-26 NOTE — ED PROVIDER NOTE - NS ED ATTENDING STATEMENT MOD
I have personally seen and examined this patient.  I have fully participated in the care of this patient. I have reviewed all pertinent clinical information, including history, physical exam, plan and the Resident’s note and agree except as noted. Otezla Pregnancy And Lactation Text: This medication is Pregnancy Category C and it isn't known if it is safe during pregnancy. It is unknown if it is excreted in breast milk.